# Patient Record
Sex: FEMALE | Race: ASIAN | Employment: FULL TIME | URBAN - METROPOLITAN AREA
[De-identification: names, ages, dates, MRNs, and addresses within clinical notes are randomized per-mention and may not be internally consistent; named-entity substitution may affect disease eponyms.]

---

## 2021-09-29 LAB
CHLAMYDIA, EXTERNAL: NEGATIVE
HBSAG, EXTERNAL: NON REACTIVE
N. GONORRHEA, EXTERNAL: NEGATIVE
RUBELLA, EXTERNAL: NORMAL
TYPE, ABO & RH, EXTERNAL: NORMAL

## 2022-04-11 LAB — GRBS, EXTERNAL: NEGATIVE

## 2022-05-13 ENCOUNTER — HOSPITAL ENCOUNTER (INPATIENT)
Age: 32
LOS: 1 days | Discharge: HOME | End: 2022-05-16
Attending: OBSTETRICS & GYNECOLOGY | Admitting: OBSTETRICS & GYNECOLOGY
Payer: COMMERCIAL

## 2022-05-13 LAB
ABO + RH BLD: NORMAL
BASOPHILS # BLD: 0 K/UL (ref 0–0.1)
BASOPHILS NFR BLD: 0 % (ref 0–2)
BLOOD GROUP ANTIBODIES SERPL: NORMAL
DIFFERENTIAL METHOD BLD: ABNORMAL
EOSINOPHIL # BLD: 0.3 K/UL (ref 0–0.4)
EOSINOPHIL NFR BLD: 3 % (ref 0–5)
ERYTHROCYTE [DISTWIDTH] IN BLOOD BY AUTOMATED COUNT: 14.1 % (ref 11.6–14.5)
HCT VFR BLD AUTO: 36.2 % (ref 35–45)
HGB BLD-MCNC: 12.1 G/DL (ref 12–16)
IMM GRANULOCYTES # BLD AUTO: 0.3 K/UL (ref 0–0.04)
IMM GRANULOCYTES NFR BLD AUTO: 3 % (ref 0–0.5)
LYMPHOCYTES # BLD: 2.3 K/UL (ref 0.9–3.6)
LYMPHOCYTES NFR BLD: 22 % (ref 21–52)
MCH RBC QN AUTO: 30.7 PG (ref 24–34)
MCHC RBC AUTO-ENTMCNC: 33.4 G/DL (ref 31–37)
MCV RBC AUTO: 91.9 FL (ref 78–100)
MONOCYTES # BLD: 0.9 K/UL (ref 0.05–1.2)
MONOCYTES NFR BLD: 8 % (ref 3–10)
NEUTS SEG # BLD: 6.7 K/UL (ref 1.8–8)
NEUTS SEG NFR BLD: 64 % (ref 40–73)
NRBC # BLD: 0 K/UL (ref 0–0.01)
NRBC BLD-RTO: 0 PER 100 WBC
PLATELET # BLD AUTO: 196 K/UL (ref 135–420)
PMV BLD AUTO: 10 FL (ref 9.2–11.8)
RBC # BLD AUTO: 3.94 M/UL (ref 4.2–5.3)
SPECIMEN EXP DATE BLD: NORMAL
WBC # BLD AUTO: 10.5 K/UL (ref 4.6–13.2)

## 2022-05-13 PROCEDURE — 3E033VJ INTRODUCTION OF OTHER HORMONE INTO PERIPHERAL VEIN, PERCUTANEOUS APPROACH: ICD-10-PCS | Performed by: STUDENT IN AN ORGANIZED HEALTH CARE EDUCATION/TRAINING PROGRAM

## 2022-05-13 PROCEDURE — 65270000029 HC RM PRIVATE

## 2022-05-13 PROCEDURE — 59200 INSERT CERVICAL DILATOR: CPT

## 2022-05-13 PROCEDURE — 77030028565 HC CATH CERV RIPNG BLN COOK -B

## 2022-05-13 PROCEDURE — 86900 BLOOD TYPING SEROLOGIC ABO: CPT

## 2022-05-13 PROCEDURE — 85025 COMPLETE CBC W/AUTO DIFF WBC: CPT

## 2022-05-13 PROCEDURE — 74011250637 HC RX REV CODE- 250/637: Performed by: ADVANCED PRACTICE MIDWIFE

## 2022-05-13 RX ORDER — TERBUTALINE SULFATE 1 MG/ML
0.25 INJECTION SUBCUTANEOUS
Status: DISCONTINUED | OUTPATIENT
Start: 2022-05-13 | End: 2022-05-15 | Stop reason: HOSPADM

## 2022-05-13 RX ORDER — OXYTOCIN/0.9 % SODIUM CHLORIDE 30/500 ML
87.3 PLASTIC BAG, INJECTION (ML) INTRAVENOUS AS NEEDED
Status: COMPLETED | OUTPATIENT
Start: 2022-05-13 | End: 2022-05-15

## 2022-05-13 RX ORDER — LIDOCAINE HYDROCHLORIDE 10 MG/ML
20 INJECTION, SOLUTION EPIDURAL; INFILTRATION; INTRACAUDAL; PERINEURAL AS NEEDED
Status: COMPLETED | OUTPATIENT
Start: 2022-05-13 | End: 2022-05-15

## 2022-05-13 RX ORDER — MINERAL OIL
30 OIL (ML) ORAL AS NEEDED
Status: COMPLETED | OUTPATIENT
Start: 2022-05-13 | End: 2022-05-15

## 2022-05-13 RX ORDER — HYDROMORPHONE HYDROCHLORIDE 1 MG/ML
1 INJECTION, SOLUTION INTRAMUSCULAR; INTRAVENOUS; SUBCUTANEOUS
Status: DISCONTINUED | OUTPATIENT
Start: 2022-05-13 | End: 2022-05-15 | Stop reason: HOSPADM

## 2022-05-13 RX ORDER — OXYTOCIN/RINGER'S LACTATE 30/500 ML
10 PLASTIC BAG, INJECTION (ML) INTRAVENOUS AS NEEDED
Status: COMPLETED | OUTPATIENT
Start: 2022-05-13 | End: 2022-05-15

## 2022-05-13 RX ORDER — ZOLPIDEM TARTRATE 5 MG/1
5 TABLET ORAL
Status: DISCONTINUED | OUTPATIENT
Start: 2022-05-13 | End: 2022-05-16 | Stop reason: HOSPADM

## 2022-05-13 RX ORDER — ONDANSETRON 2 MG/ML
4 INJECTION INTRAMUSCULAR; INTRAVENOUS
Status: DISCONTINUED | OUTPATIENT
Start: 2022-05-13 | End: 2022-05-15 | Stop reason: HOSPADM

## 2022-05-13 RX ORDER — SODIUM CHLORIDE, SODIUM LACTATE, POTASSIUM CHLORIDE, CALCIUM CHLORIDE 600; 310; 30; 20 MG/100ML; MG/100ML; MG/100ML; MG/100ML
125 INJECTION, SOLUTION INTRAVENOUS CONTINUOUS
Status: DISCONTINUED | OUTPATIENT
Start: 2022-05-13 | End: 2022-05-15 | Stop reason: HOSPADM

## 2022-05-13 RX ORDER — BUTORPHANOL TARTRATE 2 MG/ML
2 INJECTION INTRAMUSCULAR; INTRAVENOUS
Status: DISCONTINUED | OUTPATIENT
Start: 2022-05-13 | End: 2022-05-15 | Stop reason: HOSPADM

## 2022-05-13 RX ORDER — METHYLERGONOVINE MALEATE 0.2 MG/ML
0.2 INJECTION INTRAVENOUS AS NEEDED
Status: COMPLETED | OUTPATIENT
Start: 2022-05-13 | End: 2022-05-15

## 2022-05-13 RX ORDER — NALBUPHINE HYDROCHLORIDE 10 MG/ML
10 INJECTION, SOLUTION INTRAMUSCULAR; INTRAVENOUS; SUBCUTANEOUS
Status: DISCONTINUED | OUTPATIENT
Start: 2022-05-13 | End: 2022-05-15 | Stop reason: HOSPADM

## 2022-05-13 RX ADMIN — ZOLPIDEM TARTRATE 5 MG: 5 TABLET ORAL at 23:55

## 2022-05-13 NOTE — PROGRESS NOTES
Labor Progress Note    Patient seen, fetal heart rate and contraction pattern evaluated. Physical Exam:  Pelvic: Cervix 1-2,   Effaced: 50%  Station:  -2     Intact  Contractions: Every occasional minutes, mild  Fetal Heart Rate: Baseline: 140 per minute  Variability: moderate  Accelerations: yes  Decelerations: none  Cat I FHR strip    Assessment:  Not in labor. Procedure explained to the patient and her significant other. Consent obtained. A sterile speculum was inserted in the vaginal, cervix visualized. Cook cervical ripening balloon inserted into the cervix and 80mL normal saline inserted into the uterine balloon. Speculum removed. SVE to verify placement of vaginal balloon. Vaginal balloon filled with 80mL normal saline. Catheter taped to right thigh. Patient and fetus tolerated procedure well. PLAN:  Reassuring fetal status, Continue plan for vaginal delivery. Cook balloon placed. Start pitocin per protocol when cook balloon out.        Lorenzo Kang

## 2022-05-13 NOTE — PROGRESS NOTES
presents at 40.6 weeks gestation for induction. EFM applied. Hx confirmed. Patient oriented to room.

## 2022-05-13 NOTE — PROGRESS NOTES
Bedside shift change report given to BRIAN Londono RN (oncoming nurse) by MAU Christian (offgoing nurse). Report included the following information SBAR, Kardex, Intake/Output, MAR and Recent Results.

## 2022-05-13 NOTE — H&P
Obstetrical History and Physical    Subjective:     Date of Admission: 2022    Patient is a 33 y.o.   female admitted with postedates induction of labor at 36 6/7 weeks gestation. For Obstetric history, see prenantal.    For Review of Systems, see prenatal    History reviewed. No pertinent past medical history. Past Surgical History:   Procedure Laterality Date    HX WISDOM TEETH EXTRACTION Bilateral       Prior to Admission medications    Medication Sig Start Date End Date Taking? Authorizing Provider   PNV Comb #2-Iron-FA-Omega 3 29-1-400 mg cmpk Take  by mouth. Yes Provider, Historical     No Known Allergies   Social History     Tobacco Use    Smoking status: Former Smoker     Packs/day: 1.00     Years: 8.00     Pack years: 8.00     Quit date:      Years since quitting: 15.3    Smokeless tobacco: Never Used   Substance Use Topics    Alcohol use: Not Currently      History reviewed. No pertinent family history. Objective:     Blood pressure 96/78, pulse 86, temperature 98.2 °F (36.8 °C), resp. rate 18, height 5' 8\" (1.727 m), weight 95.3 kg (210 lb), SpO2 98 %, not currently breastfeeding. Temp (24hrs), Av.2 °F (36.8 °C), Min:98.2 °F (36.8 °C), Max:98.2 °F (36.8 °C)        No intake/output data recorded. No intake/output data recorded. HEENT: No pallor, no jaundice, Thyroid and throat normal  RESPIRATORY: Clear to A & P  CVS: pulse reg, HS normal  ABDOMEN: Gravid. Vertex. No abnormal tenderness. Pelvic: Cervix 1-2,   Effaced: 50%  Station:  -2  Data Review:   No results found for this or any previous visit (from the past 24 hour(s)). Monitor:    FHT:    Uterine Activity:     Assessment:     Active Problems:    * No active hospital problems.  *      Plan:       Check labs:  CBC,T&S    Disposition:     Admit to labor and delivery  Labs as ordered  IV - LR  Cook balloon placement  GBS negative  Anesthesia consult for epidural if pt desires  Anticipate vaginal delivery   CS for maternal/fetal indications  Pitocin augmentation orders placed for when cook balloon out  Dr. Annemarie Houser aware of admission and POC     I saw and examined patient.     Signed By: Trisha Pérez CNM                         May 13, 2022

## 2022-05-14 ENCOUNTER — ANESTHESIA (OUTPATIENT)
Dept: LABOR AND DELIVERY | Age: 32
End: 2022-05-14
Payer: COMMERCIAL

## 2022-05-14 ENCOUNTER — ANESTHESIA EVENT (OUTPATIENT)
Dept: LABOR AND DELIVERY | Age: 32
End: 2022-05-14
Payer: COMMERCIAL

## 2022-05-14 PROCEDURE — 74011000250 HC RX REV CODE- 250: Performed by: NURSE ANESTHETIST, CERTIFIED REGISTERED

## 2022-05-14 PROCEDURE — 10907ZC DRAINAGE OF AMNIOTIC FLUID, THERAPEUTIC FROM PRODUCTS OF CONCEPTION, VIA NATURAL OR ARTIFICIAL OPENING: ICD-10-PCS | Performed by: MIDWIFE

## 2022-05-14 PROCEDURE — 76060000078 HC EPIDURAL ANESTHESIA

## 2022-05-14 PROCEDURE — 74011250636 HC RX REV CODE- 250/636: Performed by: ADVANCED PRACTICE MIDWIFE

## 2022-05-14 PROCEDURE — 74011250636 HC RX REV CODE- 250/636

## 2022-05-14 PROCEDURE — 74011250636 HC RX REV CODE- 250/636: Performed by: NURSE ANESTHETIST, CERTIFIED REGISTERED

## 2022-05-14 PROCEDURE — 74011000258 HC RX REV CODE- 258: Performed by: NURSE ANESTHETIST, CERTIFIED REGISTERED

## 2022-05-14 RX ORDER — ROPIVACAINE IN 0.9% SOD CHL/PF 0.2 %
PLASTIC BAG, INJECTION (ML) EPIDURAL AS NEEDED
Status: DISCONTINUED | OUTPATIENT
Start: 2022-05-14 | End: 2022-05-14 | Stop reason: HOSPADM

## 2022-05-14 RX ORDER — NALOXONE HYDROCHLORIDE 0.4 MG/ML
0.2 INJECTION, SOLUTION INTRAMUSCULAR; INTRAVENOUS; SUBCUTANEOUS AS NEEDED
Status: DISCONTINUED | OUTPATIENT
Start: 2022-05-14 | End: 2022-05-15 | Stop reason: HOSPADM

## 2022-05-14 RX ORDER — SODIUM CHLORIDE 0.9 % (FLUSH) 0.9 %
5-40 SYRINGE (ML) INJECTION EVERY 8 HOURS
Status: DISCONTINUED | OUTPATIENT
Start: 2022-05-14 | End: 2022-05-15 | Stop reason: HOSPADM

## 2022-05-14 RX ORDER — LIDOCAINE HYDROCHLORIDE AND EPINEPHRINE 15; 5 MG/ML; UG/ML
INJECTION, SOLUTION EPIDURAL AS NEEDED
Status: DISCONTINUED | OUTPATIENT
Start: 2022-05-14 | End: 2022-05-14 | Stop reason: HOSPADM

## 2022-05-14 RX ORDER — SODIUM CHLORIDE 0.9 % (FLUSH) 0.9 %
5-40 SYRINGE (ML) INJECTION AS NEEDED
Status: DISCONTINUED | OUTPATIENT
Start: 2022-05-14 | End: 2022-05-15 | Stop reason: HOSPADM

## 2022-05-14 RX ORDER — OXYTOCIN/0.9 % SODIUM CHLORIDE 30/500 ML
0-20 PLASTIC BAG, INJECTION (ML) INTRAVENOUS
Status: DISCONTINUED | OUTPATIENT
Start: 2022-05-14 | End: 2022-05-16 | Stop reason: HOSPADM

## 2022-05-14 RX ORDER — EPHEDRINE SULFATE/0.9% NACL/PF 50 MG/5 ML
10 SYRINGE (ML) INTRAVENOUS AS NEEDED
Status: DISCONTINUED | OUTPATIENT
Start: 2022-05-14 | End: 2022-05-15 | Stop reason: HOSPADM

## 2022-05-14 RX ORDER — LIDOCAINE HYDROCHLORIDE 10 MG/ML
INJECTION INFILTRATION; PERINEURAL AS NEEDED
Status: DISCONTINUED | OUTPATIENT
Start: 2022-05-14 | End: 2022-05-14 | Stop reason: HOSPADM

## 2022-05-14 RX ORDER — METOCLOPRAMIDE HYDROCHLORIDE 5 MG/ML
10 INJECTION INTRAMUSCULAR; INTRAVENOUS
Status: DISCONTINUED | OUTPATIENT
Start: 2022-05-14 | End: 2022-05-15 | Stop reason: HOSPADM

## 2022-05-14 RX ORDER — FENTANYL/ROPIVACAINE/NS/PF 2MCG/ML-.1
1-15 PLASTIC BAG, INJECTION (ML) EPIDURAL
Status: DISCONTINUED | OUTPATIENT
Start: 2022-05-14 | End: 2022-05-15 | Stop reason: HOSPADM

## 2022-05-14 RX ORDER — DIPHENHYDRAMINE HYDROCHLORIDE 50 MG/ML
25 INJECTION, SOLUTION INTRAMUSCULAR; INTRAVENOUS
Status: DISCONTINUED | OUTPATIENT
Start: 2022-05-14 | End: 2022-05-15 | Stop reason: HOSPADM

## 2022-05-14 RX ORDER — PHENYLEPHRINE HCL IN 0.9% NACL 1 MG/10 ML
80 SYRINGE (ML) INTRAVENOUS AS NEEDED
Status: DISCONTINUED | OUTPATIENT
Start: 2022-05-14 | End: 2022-05-15 | Stop reason: HOSPADM

## 2022-05-14 RX ORDER — DIPHENHYDRAMINE HYDROCHLORIDE 50 MG/ML
25 INJECTION, SOLUTION INTRAMUSCULAR; INTRAVENOUS ONCE
Status: COMPLETED | OUTPATIENT
Start: 2022-05-14 | End: 2022-05-14

## 2022-05-14 RX ADMIN — SODIUM CHLORIDE, SODIUM LACTATE, POTASSIUM CHLORIDE, AND CALCIUM CHLORIDE 125 ML/HR: 600; 310; 30; 20 INJECTION, SOLUTION INTRAVENOUS at 12:58

## 2022-05-14 RX ADMIN — Medication 2 MILLI-UNITS/MIN: at 05:30

## 2022-05-14 RX ADMIN — Medication 5 ML: at 19:11

## 2022-05-14 RX ADMIN — ROPIVACAINE HYDROCHLORIDE 12 ML/HR: 5 INJECTION EPIDURAL; INFILTRATION; PERINEURAL at 19:09

## 2022-05-14 RX ADMIN — LIDOCAINE HYDROCHLORIDE 1 ML: 10 INJECTION, SOLUTION INFILTRATION; PERINEURAL at 19:05

## 2022-05-14 RX ADMIN — DIPHENHYDRAMINE HYDROCHLORIDE 25 MG: 50 INJECTION, SOLUTION INTRAMUSCULAR; INTRAVENOUS at 01:34

## 2022-05-14 RX ADMIN — SODIUM CHLORIDE, SODIUM LACTATE, POTASSIUM CHLORIDE, AND CALCIUM CHLORIDE 125 ML/HR: 600; 310; 30; 20 INJECTION, SOLUTION INTRAVENOUS at 00:29

## 2022-05-14 RX ADMIN — LIDOCAINE HYDROCHLORIDE AND EPINEPHRINE 4.5 ML: 15; 5 INJECTION, SOLUTION EPIDURAL at 19:06

## 2022-05-14 RX ADMIN — SODIUM CHLORIDE, SODIUM LACTATE, POTASSIUM CHLORIDE, AND CALCIUM CHLORIDE 125 ML/HR: 600; 310; 30; 20 INJECTION, SOLUTION INTRAVENOUS at 19:03

## 2022-05-14 NOTE — ANESTHESIA PREPROCEDURE EVALUATION
Relevant Problems   No relevant active problems       Anesthetic History   No history of anesthetic complications            Review of Systems / Medical History  Patient summary reviewed, nursing notes reviewed and pertinent labs reviewed    Pulmonary  Within defined limits                 Neuro/Psych   Within defined limits           Cardiovascular  Within defined limits                Exercise tolerance: >4 METS     GI/Hepatic/Renal  Within defined limits              Endo/Other  Within defined limits           Other Findings              Physical Exam    Airway  Mallampati: I  TM Distance: 4 - 6 cm  Neck ROM: normal range of motion   Mouth opening: Normal     Cardiovascular  Regular rate and rhythm,  S1 and S2 normal,  no murmur, click, rub, or gallop             Dental  No notable dental hx       Pulmonary                 Abdominal  GI exam deferred       Other Findings            Anesthetic Plan    ASA: 1  Anesthesia type: epidural            Anesthetic plan and risks discussed with: Patient and Father      Risks reviewed with pt and significant other, agree to proceed.

## 2022-05-14 NOTE — PROGRESS NOTES
0715 Bedside and Verbal shift change report given to BIANKA Man (oncoming nurse) by Zachary Perez RN (offgoing nurse). Report included the following information SBAR, Kardex, Procedure Summary, Intake/Output, MAR and Recent Results. Assessment completed. 0725 KWAN Andrews GARIMA on unit. Requested breakfast for pt. Okay to eat breakfast, clear liquids after. SBAR to KWAN Andrews GARIMA Pitocin on 2 mu/min and will increase when acuity level of pt's on unit allows for safe increase. Menu provided to pt.    8533 Unit acuity level safe for increasing pitocin. TOCO adjusted. Pitocin up to 4 mu/min as ordered. 4100 Manoj APARICIOM to bedside. 1608 SVE done and AROM of mod amt clear fluid. 1829 Pt requests epidural at this time. IV bolus started. 685 Old Dear Flo Paged anesthesia provider. [address] [place], Anesthesia at bedside    1859: In position for epidural    1900: Epidural time out    1906: Epidural catheter placed, needle out    1906: Epidural test dose    1910: To left tilt    1911: Epidural loading dose admin    1915 Bedside and Verbal shift change report given to BRIAN Johnston RN (oncoming nurse) by John Man (offgoing nurse). Report included the following information SBAR, Kardex, Procedure Summary, Intake/Output, MAR and Recent Results.

## 2022-05-14 NOTE — ANESTHESIA PROCEDURE NOTES
Epidural Block    Patient location during procedure: OB  Start time: 5/14/2022 7:05 PM  End time: 5/14/2022 7:06 PM  Reason for block: labor epidural  Staffing  Performed: CRNA   Anesthesiologist: Jarrod Zamudio DO  Resident/CRNA: Dusty HENDRICKS CRNA  Preanesthetic Checklist  Completed: patient identified, IV checked, site marked, risks and benefits discussed, surgical consent, monitors and equipment checked, pre-op evaluation and timeout performed  Block Placement  Patient position: sitting  Prep: ChloraPrep  Sterility prep: mask, hand, gloves, drape and cap  Sedation level: no sedation  Patient monitoring: heart rate, frequent blood pressure checks and continuous pulse oximetry  Approach: midline  Location: lumbar  Lumbar location: L3-L4  Epidural  Loss of resistance technique: saline  Guidance: landmark technique  Needle  Needle type: Tuohy   Needle gauge: 17 G  Needle length: 9 cm  Needle insertion depth: 5 cm  Catheter type: end hole  Catheter size: 19 G  Catheter at skin depth: 10 cm  Catheter securement method: clear occlusive dressing, stabilization device and surgical tape  Test dose: negative  Assessment  Sensory level: T6  Block outcome: pain improved  Number of attempts: 1  Procedure assessment: patient tolerated procedure well with no immediate complications

## 2022-05-14 NOTE — PROGRESS NOTES
: Bedside and Verbal shift change report given to BRIAN Paulino RN (oncoming nurse) by Franc Lora. Ronny Delgado (offgoing nurse). Report included the following information SBAR, Kardex, Procedure Summary, Intake/Output, MAR and Recent Results. Vitaliy Godoy is laying comfortably in bed with a left tilt following her epidural placement. : Vitaliy Godoy attempted to void into a bed pan, was unable. Discussed straight cath and re-attempt vs indwelling foote, she elected to have foote catheter placed. No difficulty with catheter placement, clear yellow urine noted upon insertion. : SVE by JAMIE Cano: 6/90/-2. Vitaliy Godoy repositioned to L side-lying with peanut ball.    : Recurrent lates noted. Repositioned to R side-lying with peanut ball. : Recurrent lates noted. [facility] : SVE per JAMIE Estrella CNM: 6/100/-2 IUPC placed    010: Per JAMIE Cano, ok for 30 minute pitocin break, 400mg tums PO, 500mL bolus LR, and restart pitocin at 10mU in 30 minutes. 0110: Discussed pitocin break with Vitaliy Godoy, stated that she is feeling constant rectal pressure. SVE: 9.5/100/-1. No pitocin break at this time, patient repositioned and will labor down. Vitaliy Godoy declines Tums. 0140Nodolores Hickey reporting increased rectal pressure and urge to push. Kang Cleary CNM to bedside, SVE: 9.5/100/0. Patient repositioned and advised to call if changes or increased urge. 0215: C/C/+1 JAMIE Cano to bedside for second stage labor    0332:     0336: Placenta, bolus 10u pitocin    0338: Cytotec placed rectally    0340: Methergine given    0345: Hemabate given    0350: TXA administered via IVPB    0405: Stadol given for patient comfort during manual removal of clots    0408: Second hemabate given    0415: Bakri balloon placed by JAMIE Paige MD and JAMIE Cnao with 300mL internally.     0421: Bakri expelled

## 2022-05-14 NOTE — PROGRESS NOTES
CNM at bedside for placement of cook catheter. Patient tolerated well and denies further needs at this time.

## 2022-05-15 LAB
APTT PPP: 28 SEC (ref 23–36.4)
BASOPHILS # BLD: 0 K/UL (ref 0–0.1)
BASOPHILS NFR BLD: 0 % (ref 0–2)
DIFFERENTIAL METHOD BLD: ABNORMAL
EOSINOPHIL # BLD: 0 K/UL (ref 0–0.4)
EOSINOPHIL NFR BLD: 0 % (ref 0–5)
ERYTHROCYTE [DISTWIDTH] IN BLOOD BY AUTOMATED COUNT: 14.5 % (ref 11.6–14.5)
HCT VFR BLD AUTO: 35.5 % (ref 35–45)
HGB BLD-MCNC: 11.4 G/DL (ref 12–16)
IMM GRANULOCYTES # BLD AUTO: 0.1 K/UL (ref 0–0.04)
IMM GRANULOCYTES NFR BLD AUTO: 1 % (ref 0–0.5)
INR PPP: 1 (ref 0.8–1.2)
LYMPHOCYTES # BLD: 1.4 K/UL (ref 0.9–3.6)
LYMPHOCYTES NFR BLD: 9 % (ref 21–52)
MCH RBC QN AUTO: 29.9 PG (ref 24–34)
MCHC RBC AUTO-ENTMCNC: 32.1 G/DL (ref 31–37)
MCV RBC AUTO: 93.2 FL (ref 78–100)
MONOCYTES # BLD: 1.2 K/UL (ref 0.05–1.2)
MONOCYTES NFR BLD: 7 % (ref 3–10)
NEUTS SEG # BLD: 13.9 K/UL (ref 1.8–8)
NEUTS SEG NFR BLD: 84 % (ref 40–73)
NRBC # BLD: 0 K/UL (ref 0–0.01)
NRBC BLD-RTO: 0 PER 100 WBC
PLATELET # BLD AUTO: 170 K/UL (ref 135–420)
PMV BLD AUTO: 10.2 FL (ref 9.2–11.8)
PROTHROMBIN TIME: 13.6 SEC (ref 11.5–15.2)
RBC # BLD AUTO: 3.81 M/UL (ref 4.2–5.3)
WBC # BLD AUTO: 16.6 K/UL (ref 4.6–13.2)

## 2022-05-15 PROCEDURE — 75410000003 HC RECOV DEL/VAG/CSECN EA 0.5 HR

## 2022-05-15 PROCEDURE — 85610 PROTHROMBIN TIME: CPT

## 2022-05-15 PROCEDURE — 0KQM0ZZ REPAIR PERINEUM MUSCLE, OPEN APPROACH: ICD-10-PCS | Performed by: MIDWIFE

## 2022-05-15 PROCEDURE — 74011250637 HC RX REV CODE- 250/637

## 2022-05-15 PROCEDURE — 74011250636 HC RX REV CODE- 250/636: Performed by: OBSTETRICS & GYNECOLOGY

## 2022-05-15 PROCEDURE — 85025 COMPLETE CBC W/AUTO DIFF WBC: CPT

## 2022-05-15 PROCEDURE — 74011250637 HC RX REV CODE- 250/637: Performed by: MIDWIFE

## 2022-05-15 PROCEDURE — 74011250636 HC RX REV CODE- 250/636: Performed by: ADVANCED PRACTICE MIDWIFE

## 2022-05-15 PROCEDURE — 74011250636 HC RX REV CODE- 250/636

## 2022-05-15 PROCEDURE — 74011000258 HC RX REV CODE- 258: Performed by: OBSTETRICS & GYNECOLOGY

## 2022-05-15 PROCEDURE — 75410000000 HC DELIVERY VAGINAL/SINGLE

## 2022-05-15 PROCEDURE — 74011250636 HC RX REV CODE- 250/636: Performed by: MIDWIFE

## 2022-05-15 PROCEDURE — 74011000250 HC RX REV CODE- 250

## 2022-05-15 PROCEDURE — 85730 THROMBOPLASTIN TIME PARTIAL: CPT

## 2022-05-15 PROCEDURE — 88307 TISSUE EXAM BY PATHOLOGIST: CPT

## 2022-05-15 PROCEDURE — 74011000258 HC RX REV CODE- 258: Performed by: MIDWIFE

## 2022-05-15 PROCEDURE — 74011250636 HC RX REV CODE- 250/636: Performed by: NURSE ANESTHETIST, CERTIFIED REGISTERED

## 2022-05-15 PROCEDURE — 74011000258 HC RX REV CODE- 258

## 2022-05-15 PROCEDURE — 74011000250 HC RX REV CODE- 250: Performed by: MIDWIFE

## 2022-05-15 PROCEDURE — 75410000002 HC LABOR FEE PER 1 HR

## 2022-05-15 RX ORDER — LANOLIN ALCOHOL/MO/W.PET/CERES
1 CREAM (GRAM) TOPICAL
Status: DISCONTINUED | OUTPATIENT
Start: 2022-05-15 | End: 2022-05-16 | Stop reason: HOSPADM

## 2022-05-15 RX ORDER — FENTANYL/ROPIVACAINE/NS/PF 2MCG/ML-.1
PLASTIC BAG, INJECTION (ML) EPIDURAL
Status: COMPLETED
Start: 2022-05-15 | End: 2022-05-15

## 2022-05-15 RX ORDER — ACETAMINOPHEN 325 MG/1
650 TABLET ORAL
Status: DISCONTINUED | OUTPATIENT
Start: 2022-05-15 | End: 2022-05-16 | Stop reason: HOSPADM

## 2022-05-15 RX ORDER — CARBOPROST TROMETHAMINE 250 UG/ML
250 INJECTION, SOLUTION INTRAMUSCULAR ONCE
Status: COMPLETED | OUTPATIENT
Start: 2022-05-15 | End: 2022-05-15

## 2022-05-15 RX ORDER — AMPICILLIN 2 G/1
2 INJECTION, POWDER, FOR SOLUTION INTRAVENOUS EVERY 6 HOURS
Status: DISCONTINUED | OUTPATIENT
Start: 2022-05-15 | End: 2022-05-15

## 2022-05-15 RX ORDER — TRANEXAMIC ACID 10 MG/ML
INJECTION, SOLUTION INTRAVENOUS
Status: COMPLETED
Start: 2022-05-15 | End: 2022-05-15

## 2022-05-15 RX ORDER — LOPERAMIDE HYDROCHLORIDE 2 MG/1
4 CAPSULE ORAL ONCE
Status: ACTIVE | OUTPATIENT
Start: 2022-05-15 | End: 2022-05-15

## 2022-05-15 RX ORDER — LOPERAMIDE HYDROCHLORIDE 2 MG/1
4 CAPSULE ORAL
Status: DISCONTINUED | OUTPATIENT
Start: 2022-05-15 | End: 2022-05-16 | Stop reason: HOSPADM

## 2022-05-15 RX ORDER — IBUPROFEN 400 MG/1
800 TABLET ORAL
Status: DISCONTINUED | OUTPATIENT
Start: 2022-05-15 | End: 2022-05-16 | Stop reason: HOSPADM

## 2022-05-15 RX ORDER — CALCIUM CARBONATE 200(500)MG
400 TABLET,CHEWABLE ORAL ONCE
Status: DISPENSED | OUTPATIENT
Start: 2022-05-15 | End: 2022-05-15

## 2022-05-15 RX ORDER — MISOPROSTOL 200 UG/1
TABLET ORAL
Status: DISCONTINUED
Start: 2022-05-15 | End: 2022-05-15 | Stop reason: WASHOUT

## 2022-05-15 RX ORDER — CEFAZOLIN SODIUM/WATER 2 G/20 ML
2 SYRINGE (ML) INTRAVENOUS ONCE
Status: COMPLETED | OUTPATIENT
Start: 2022-05-15 | End: 2022-05-15

## 2022-05-15 RX ORDER — ACETAMINOPHEN 500 MG
1000 TABLET ORAL ONCE
Status: COMPLETED | OUTPATIENT
Start: 2022-05-15 | End: 2022-05-15

## 2022-05-15 RX ORDER — TRANEXAMIC ACID 10 MG/ML
1 INJECTION, SOLUTION INTRAVENOUS ONCE
Status: COMPLETED | OUTPATIENT
Start: 2022-05-15 | End: 2022-05-15

## 2022-05-15 RX ORDER — OXYCODONE AND ACETAMINOPHEN 5; 325 MG/1; MG/1
2 TABLET ORAL
Status: DISCONTINUED | OUTPATIENT
Start: 2022-05-15 | End: 2022-05-16 | Stop reason: HOSPADM

## 2022-05-15 RX ORDER — LANOLIN ALCOHOL/MO/W.PET/CERES
3 CREAM (GRAM) TOPICAL
Status: DISCONTINUED | OUTPATIENT
Start: 2022-05-15 | End: 2022-05-16 | Stop reason: HOSPADM

## 2022-05-15 RX ORDER — MISOPROSTOL 200 UG/1
TABLET ORAL
Status: COMPLETED
Start: 2022-05-15 | End: 2022-05-15

## 2022-05-15 RX ORDER — AMOXICILLIN 250 MG
1 CAPSULE ORAL
Status: DISCONTINUED | OUTPATIENT
Start: 2022-05-15 | End: 2022-05-16 | Stop reason: HOSPADM

## 2022-05-15 RX ORDER — PROMETHAZINE HYDROCHLORIDE 25 MG/ML
25 INJECTION, SOLUTION INTRAMUSCULAR; INTRAVENOUS
Status: DISCONTINUED | OUTPATIENT
Start: 2022-05-15 | End: 2022-05-16 | Stop reason: HOSPADM

## 2022-05-15 RX ADMIN — SODIUM CHLORIDE, POTASSIUM CHLORIDE, SODIUM LACTATE AND CALCIUM CHLORIDE 500 ML: 600; 310; 30; 20 INJECTION, SOLUTION INTRAVENOUS at 16:06

## 2022-05-15 RX ADMIN — CARBOPROST TROMETHAMINE 250 MCG: 250 INJECTION, SOLUTION INTRAMUSCULAR at 04:08

## 2022-05-15 RX ADMIN — BUTORPHANOL TARTRATE 2 MG: 2 INJECTION, SOLUTION INTRAMUSCULAR; INTRAVENOUS at 04:05

## 2022-05-15 RX ADMIN — AMPICILLIN SODIUM 2 G: 2 INJECTION, POWDER, FOR SOLUTION INTRAVENOUS at 09:25

## 2022-05-15 RX ADMIN — METHYLERGONOVINE MALEATE 0.2 MG: 0.2 INJECTION, SOLUTION INTRAMUSCULAR; INTRAVENOUS at 03:40

## 2022-05-15 RX ADMIN — AMPICILLIN SODIUM 2 G: 2 INJECTION, POWDER, FOR SOLUTION INTRAVENOUS at 02:56

## 2022-05-15 RX ADMIN — LIDOCAINE HYDROCHLORIDE 20 ML: 10 INJECTION, SOLUTION EPIDURAL; INFILTRATION; INTRACAUDAL; PERINEURAL at 03:55

## 2022-05-15 RX ADMIN — Medication 20 MILLI-UNITS/MIN: at 00:03

## 2022-05-15 RX ADMIN — SODIUM CHLORIDE, POTASSIUM CHLORIDE, SODIUM LACTATE AND CALCIUM CHLORIDE 1000 ML: 600; 310; 30; 20 INJECTION, SOLUTION INTRAVENOUS at 04:24

## 2022-05-15 RX ADMIN — CEFAZOLIN 2 G: 10 INJECTION, POWDER, FOR SOLUTION INTRAVENOUS at 05:20

## 2022-05-15 RX ADMIN — ACETAMINOPHEN 1000 MG: 500 TABLET ORAL at 02:53

## 2022-05-15 RX ADMIN — AMPICILLIN SODIUM 2 G: 2 INJECTION, POWDER, FOR SOLUTION INTRAVENOUS at 21:32

## 2022-05-15 RX ADMIN — TRANEXAMIC ACID 1000 MG: 10 INJECTION, SOLUTION INTRAVENOUS at 03:50

## 2022-05-15 RX ADMIN — AMPICILLIN SODIUM 2 G: 2 INJECTION, POWDER, FOR SOLUTION INTRAVENOUS at 15:13

## 2022-05-15 RX ADMIN — MINERAL OIL 30 ML: 1000 SOLUTION ORAL at 03:25

## 2022-05-15 RX ADMIN — GENTAMICIN SULFATE 320 MG: 40 INJECTION, SOLUTION INTRAMUSCULAR; INTRAVENOUS at 04:20

## 2022-05-15 RX ADMIN — Medication 87.3 MILLI-UNITS/MIN: at 03:53

## 2022-05-15 RX ADMIN — ROPIVACAINE HYDROCHLORIDE 12 ML/HR: 5 INJECTION EPIDURAL; INFILTRATION; PERINEURAL at 02:41

## 2022-05-15 RX ADMIN — MISOPROSTOL 1000 MCG: 200 TABLET ORAL at 03:38

## 2022-05-15 RX ADMIN — FERROUS SULFATE TAB 325 MG (65 MG ELEMENTAL FE) 325 MG: 325 (65 FE) TAB at 09:25

## 2022-05-15 RX ADMIN — Medication 10000 MILLI-UNITS: at 03:37

## 2022-05-15 RX ADMIN — LOPERAMIDE HYDROCHLORIDE 4 MG: 2 CAPSULE ORAL at 04:22

## 2022-05-15 RX ADMIN — CARBOPROST TROMETHAMINE 250 MCG: 250 INJECTION, SOLUTION INTRAMUSCULAR at 03:49

## 2022-05-15 NOTE — PROGRESS NOTES
OB Labor Note    Assessment:   G1 @ 41+0, IOL for late term, now IUP in active labor - s/p cook catheter, pitocin, AROM clear, IUPC this exam              Cat II fetal tracing - overall reassuring              Low risk PPH              Comfortable with epidural     Plan:   Fetal resuscitative measures as indicated   Titrate pitocin for adequate MVU's              Continue to monitor labor              Anticipate                    Subjective:              Pt. does not have any complaints. Pt. is not feeling contractions. Pt. is feeling fetal movement.       Objective:     V/E: 2258  6/100/-2, IUPC placed     Ctx: 3-4 min.     Cat II fetal tracing - baseline rate 140, accelerations, intermittent late decelerations, one variable deceleration, moderate variability       Visit Vitals  /73   Pulse 98   Temp 98.2 °F (36.8 °C)   Resp 16   Ht 5' 8\" (1.727 m)   Wt 95.3 kg (210 lb)   SpO2 99%   Breastfeeding No   BMI 31.93 kg/m²      Cervical Exam  Dilation (cm): 6  Eff: 100 %  Station: -2  Vaginal exam done by? : JAMIE Johansen CNM  Baby Position: Vertex  Membrane Status: AROM     Patient Vitals for the past 4 hrs:    Mode Fetal Heart Rate Variability Decelerations Accelerations RN Reviewed Strip?   22 2245      Yes   22 2230 External 140 (!) Less than or equal to 5 BPM None Yes    22 2215      Yes   22 2200 External 145 6-25 BPM Early;Variable Yes    22 2145      Yes   22 2130 External 160 6-25 BPM (!) Late No    22      Yes   22 2100 External 160 6-25 BPM (!) Late No    22      Yes   22      Yes   22 2000 External 140 6-25 BPM Variable Yes    22 1945      Yes   22 1930 External 135 6-25 BPM None Yes    22 1915      Yes          2022 11:07 PM

## 2022-05-15 NOTE — PROGRESS NOTES
Bedside and Verbal shift change report given to SONIA Bush (oncoming nurse) by Erika Foster RN (offgoing nurse). Report included the following information SBAR, Procedure Summary, Intake/Output, MAR and Recent Results. 0900: Assisted pt to bathroom using tommy-steady, foote catheter removed, uriel care, pad and gown changed. Pt ambulated back to bed. 1045: TRANSFER - OUT REPORT:    Verbal report given to BIANKA Heredia RN (name) on [facility]  being transferred to Mother Baby (unit) for routine progression of care       Report consisted of patients Situation, Background, Assessment and   Recommendations(SBAR). Information from the following report(s) SBAR, Procedure Summary, Intake/Output, MAR and Recent Results was reviewed with the receiving nurse. Lines:   Peripheral IV 05/13/22 Anterior;Distal;Left Forearm (Active)   Site Assessment Clean, dry, & intact 05/15/22 0716   Phlebitis Assessment 0 05/15/22 0716   Infiltration Assessment 0 05/15/22 0716   Dressing Status Clean, dry, & intact 05/15/22 0716   Dressing Type Transparent;Tape 05/15/22 0716   Hub Color/Line Status Pink;Capped 05/15/22 4781        Opportunity for questions and clarification was provided.       Patient transported with:   Registered Nurse

## 2022-05-15 NOTE — PROGRESS NOTES
1055 TRANSFER - IN REPORT:    Verbal report received from SONIA De La Cruz (name) on Tiff Lemus  being received from L&D(unit) for routine progression of care      Report consisted of patients Situation, Background, Assessment and   Recommendations(SBAR). Information from the following report(s) SBAR, Kardex, Procedure Summary, Intake/Output, MAR and Recent Results was reviewed with the receiving nurse. Opportunity for questions and clarification was provided. 65 infant fall prevention and safety agreement educated to patient and FOB and signed    1110 shift assessment complete, patient has no needs or concerns at this time    1120 got  latched    1133 patient given blue folder and educated on it    [phone] patient up to bathroom    1400 patient up to bathroom, voided 250ml and had small BM    1513 antibiotic started, reassessment complete    1535 informed Milena Mariela CNM of pt blood pressure, orders received waiting for them to be put in     1550 patient up to bathroom, voided 700mL    1606 Bolus started    1636 unhooked from IV, patient to breastfeed     1  BP and pulse retaken, patient has no needs or concerns at this time    1910 Bedside and Verbal shift change report given to KWAN Garrett RN (oncoming nurse) by Mary Cohen RN (offgoing nurse). Report included the following information SBAR, Kardex, Procedure Summary, Intake/Output, MAR and Recent Results.

## 2022-05-15 NOTE — PROGRESS NOTES
OB Labor Note    Assessment:   G1 @ 41+0, IOL for late term, now IUP in active labor - s/p cook catheter, pitocin, AROM clear, IUPC placed for MVU's              Cat II fetal tracing - overall reassuring              Low risk PPH              Comfortable with epidural     Plan:   Fetal resuscitative measures as indicated              Titrate pitocin for adequate MVU's              Continue to monitor labor              Anticipate                    Subjective:              Pt. does not have any complaints except for pelvic pressure.  Pt. is not feeling contractions.  Pt. is feeling fetal movement.       Objective:     V/E: 0138  9.5/100/0     Ctx: 3-4 min., inadequate MVU's     Cat II fetal tracing - baseline rate 150, accelerations, intermittent late, early and variable decelerations,  moderate variability     Visit Vitals  BP (!) 107/56   Pulse 94   Temp 98.2 °F (36.8 °C)   Resp 16   Ht 5' 8\" (1.727 m)   Wt 95.3 kg (210 lb)   SpO2 99%   Breastfeeding No   BMI 31.93 kg/m²      Cervical Exam  Dilation (cm): 9.5  Eff: 100 %  Station: 0  Vaginal exam done by? : JAMIE Rinaldi CNM  Baby Position: Vertex  Membrane Status: AROM     Patient Vitals for the past 4 hrs:    Mode Fetal Heart Rate Variability Decelerations Accelerations RN Reviewed Strip?   05/15/22 0000 External 145 6-25 BPM None No    22 2345      Yes   22 2330 External 140 6-25 BPM Variable Yes    22 2315      Yes   22 2300 External 145 6-25 BPM None Yes    22 2245      Yes   22 2230 External 140 (!) Less than or equal to 5 BPM None Yes    22 2215      Yes   22 2200 External 145 6-25 BPM Early;Variable Yes           5/15/2022 1:47 AM

## 2022-05-15 NOTE — PROGRESS NOTES
OB Labor Note    Assessment:   G1 @ 41+0, IOL for late term, now IUP in active labor - s/p cook catheter, pitocin, AROM clear   Cat II fetal tracing - overall reassuring   Low risk PPH   Comfortable with recent epidural    Plan: Fetal resuscitative measures as indicated   Continue to monitor labor   Anticipate        Subjective:   Pt. does not have any complaints. Pt. is not feeling contractions. Pt. is feeling fetal movement. Objective:    V/E:  6/90/-2    Ctx: 2-4 min. Cat II fetal tracing - baseline rate 140, accelerations, intermittent late decelerations, moderate variability     Visit Vitals  /72   Pulse 99   Temp 98.2 °F (36.8 °C)   Resp 16   Ht 5' 8\" (1.727 m)   Wt 95.3 kg (210 lb)   SpO2 99%   Breastfeeding No   BMI 31.93 kg/m²      Cervical Exam  Dilation (cm): 4.5  Eff: 50 %  Station: -1  Vaginal exam done by? : KWAN Andrews CNSUMANTH  Membrane Status: AROM     Patient Vitals for the past 4 hrs: Mode Fetal Heart Rate Variability Decelerations Accelerations RN Reviewed Strip?   22      Yes   22 External 140 6-25 BPM Variable Yes    22 194  Lyssa Coronel  Yes   22 1930 External 135 6-25 BPM None Yes    22 191  Isismariusz Ayse   Yes   22 190 External 140 6-25 BPM Variable Yes Yes   22 1845      Yes   22 1830 US Readjusted; External 140 6-25 BPM   Yes   22 1815      Yes   22 1810 US Readjusted        22 1800 US Readjusted; External 125 6-25 BPM None Yes Yes   22 1745      Yes   22 1730 US Readjusted; External 125 6-25 BPM None Yes Yes   22 1715      Yes   22 1700 External 130 6-25 BPM None Yes Yes          2022 8:57 PM

## 2022-05-15 NOTE — PROGRESS NOTES
DELIVERY SUMMARY    Patient:  Farshad Serrato    Delivery Type: Vaginal, Spontaneous   Delivery Date:   Delivery Time:  80  AM      Birth Weight:   46g     Sex:  female    Delivery Clinician:  Sofiya Mtz   Gestational Age: 41+1     Presentation: Vertex   Position: OA to Left  Occiput  Anterior      Apgars were 9  and 9       Resuscitation Method: Tactile Stimulation;Suctioning-bulb     Meconium Stained: None    Living Status: Living        Placenta Date/Time:  1030  AM   Placenta Removal: Spontaneous   Placenta Appearance: Normal - sent to pathology for suspected chorioamnionitis     Cord Information: 3 Vessels          Disposition of Cord Blood: lab for ABO/DNA    Blood Gases Sent?:  No         Cord pH:  none     Episiotomy: none  Laceration(s):  second degree ML    Estimated Blood Loss (ml): QBL 1645     Labor Events  Method:     IOL   Augmentation:  n/a  Cervical Ripening:  cook catheter     Induction - yes     Induction Indication - late term     Induction Method - pitocin, AROM     Complications - chorioamnionitis, PPH     NICU Admit - no     HTN Disorders - none     Diabetes - N/A     Operative Vaginal Delivery - none     Additional Delivery Comments - during second stage noted febrile to 101.4 with maternal and fetal tachycardia - tylenol 1g PO, AMP 2 gm/ Gentamycin 5mg/kg IVPB started for suspected chorioamnionitis,   viable female infant, OA to MYKEL,  infant to mother in stable condition for apgars of 9/9 , delayed cord clamping for 1 minute until pulsations ceased, cord clamped and cut by FOB and cord blood for ABO/DNA obtained, pitocin infusing for active management of third stage, placenta delivered appearing intact, soto, spont.  With 3 vc, fundus boggy - bimanual compressions while methergine ordered/given IM, uterine sweep with few membranes noted/ clots removed - uterine atony continued - cytotec 1000 mcg [place] placed, TXA 1 gm given, hemabait 250 mcg IM, straight catheter for bladder drainage,  and vaginal bleeding slowed, perineum inspected - second degree ML laceration noted - started repair with 3-0 vicryl, vaginal bleeding increased during laceration repair, stopped repair - uterine atony again, second IV site started and CBC/coag. studies drawn, stadol given for pain and epidural bolused,  uterine sweep again for membranes, Dr. Justin Ybarra called for bakri confirmation placement - placed and filled with 300 ml.  NS, Ancef 2g IVPB ordered/given, second hemabait ordered/given IM, Imodium 4mg given,  foote catheter placed,  then with uterine contractions Bakri expelled into vagina, vaginal bleeding now appeared under control, vaginal clots removed, local anesthesia with lidocaine placed for continued laceration repair/completed,  QBL 1722, complications: chorioamnionitis, PPH;   mother and infant in stable condition, discussed complications of delivery with parents

## 2022-05-15 NOTE — PROGRESS NOTES
Problem: Patient Education: Go to Patient Education Activity  Goal: Patient/Family Education  Outcome: Progressing Towards Goal     Problem: Pain  Goal: *Control of Pain  Outcome: Progressing Towards Goal     Problem: Patient Education: Go to Patient Education Activity  Goal: Patient/Family Education  Outcome: Progressing Towards Goal     Problem: Vaginal Delivery: Day of Delivery-Post delivery  Goal: Activity/Safety  Outcome: Progressing Towards Goal  Goal: Nutrition/Diet  Outcome: Progressing Towards Goal  Goal: Discharge Planning  Outcome: Progressing Towards Goal  Goal: Medications  Outcome: Progressing Towards Goal  Goal: Treatments/Interventions/Procedures  Outcome: Progressing Towards Goal  Goal: *Vital signs within defined limits  Outcome: Progressing Towards Goal  Goal: *Labs within defined limits  Outcome: Progressing Towards Goal  Goal: *Hemodynamically stable  Outcome: Progressing Towards Goal  Goal: *Optimal pain control at patient's stated goal  Outcome: Progressing Towards Goal  Goal: *Participates in infant care  Outcome: Progressing Towards Goal  Goal: *Demonstrates progressive activity  Outcome: Progressing Towards Goal  Goal: *Tolerating diet  Outcome: Progressing Towards Goal

## 2022-05-16 VITALS
RESPIRATION RATE: 18 BRPM | OXYGEN SATURATION: 98 % | DIASTOLIC BLOOD PRESSURE: 58 MMHG | HEIGHT: 68 IN | TEMPERATURE: 97.5 F | HEART RATE: 82 BPM | WEIGHT: 210 LBS | BODY MASS INDEX: 31.83 KG/M2 | SYSTOLIC BLOOD PRESSURE: 109 MMHG

## 2022-05-16 PROBLEM — Z34.90 PREGNANCY: Status: ACTIVE | Noted: 2022-05-16

## 2022-05-16 LAB
BASOPHILS # BLD: 0.1 K/UL (ref 0–0.1)
BASOPHILS NFR BLD: 0 % (ref 0–2)
DIFFERENTIAL METHOD BLD: ABNORMAL
EOSINOPHIL # BLD: 0.2 K/UL (ref 0–0.4)
EOSINOPHIL NFR BLD: 1 % (ref 0–5)
ERYTHROCYTE [DISTWIDTH] IN BLOOD BY AUTOMATED COUNT: 14.6 % (ref 11.6–14.5)
HCT VFR BLD AUTO: 27.9 % (ref 35–45)
HGB BLD-MCNC: 8.9 G/DL (ref 12–16)
IMM GRANULOCYTES # BLD AUTO: 0.2 K/UL (ref 0–0.04)
IMM GRANULOCYTES NFR BLD AUTO: 1 % (ref 0–0.5)
LYMPHOCYTES # BLD: 3.6 K/UL (ref 0.9–3.6)
LYMPHOCYTES NFR BLD: 24 % (ref 21–52)
MCH RBC QN AUTO: 30.9 PG (ref 24–34)
MCHC RBC AUTO-ENTMCNC: 31.9 G/DL (ref 31–37)
MCV RBC AUTO: 96.9 FL (ref 78–100)
MONOCYTES # BLD: 1.2 K/UL (ref 0.05–1.2)
MONOCYTES NFR BLD: 8 % (ref 3–10)
NEUTS SEG # BLD: 9.6 K/UL (ref 1.8–8)
NEUTS SEG NFR BLD: 65 % (ref 40–73)
NRBC # BLD: 0 K/UL (ref 0–0.01)
NRBC BLD-RTO: 0 PER 100 WBC
PLATELET # BLD AUTO: 181 K/UL (ref 135–420)
PMV BLD AUTO: 10.2 FL (ref 9.2–11.8)
RBC # BLD AUTO: 2.88 M/UL (ref 4.2–5.3)
WBC # BLD AUTO: 14.8 K/UL (ref 4.6–13.2)

## 2022-05-16 PROCEDURE — 36415 COLL VENOUS BLD VENIPUNCTURE: CPT

## 2022-05-16 PROCEDURE — 74011250637 HC RX REV CODE- 250/637: Performed by: MIDWIFE

## 2022-05-16 PROCEDURE — 85025 COMPLETE CBC W/AUTO DIFF WBC: CPT

## 2022-05-16 PROCEDURE — 65270000029 HC RM PRIVATE

## 2022-05-16 PROCEDURE — 74011000258 HC RX REV CODE- 258: Performed by: MIDWIFE

## 2022-05-16 PROCEDURE — 74011250636 HC RX REV CODE- 250/636: Performed by: MIDWIFE

## 2022-05-16 RX ORDER — IBUPROFEN 800 MG/1
800 TABLET ORAL
Qty: 90 TABLET | Refills: 0 | Status: SHIPPED | OUTPATIENT
Start: 2022-05-16

## 2022-05-16 RX ORDER — LANOLIN ALCOHOL/MO/W.PET/CERES
325 CREAM (GRAM) TOPICAL
Qty: 90 TABLET | Refills: 1 | Status: SHIPPED | OUTPATIENT
Start: 2022-05-17

## 2022-05-16 RX ADMIN — IBUPROFEN 800 MG: 400 TABLET, FILM COATED ORAL at 09:09

## 2022-05-16 RX ADMIN — FERROUS SULFATE TAB 325 MG (65 MG ELEMENTAL FE) 325 MG: 325 (65 FE) TAB at 09:03

## 2022-05-16 RX ADMIN — ACETAMINOPHEN 650 MG: 325 TABLET ORAL at 09:10

## 2022-05-16 RX ADMIN — GENTAMICIN SULFATE 320 MG: 40 INJECTION, SOLUTION INTRAMUSCULAR; INTRAVENOUS at 04:18

## 2022-05-16 NOTE — PROGRESS NOTES
Problem: Patient Education: Go to Patient Education Activity  Goal: Patient/Family Education  Outcome: Resolved/Met     Problem: Pain  Goal: *Control of Pain  Outcome: Resolved/Met     Problem: Patient Education: Go to Patient Education Activity  Goal: Patient/Family Education  Outcome: Resolved/Met     Problem: Vaginal Delivery: Day of Delivery-Post delivery  Goal: Off Pathway (Use only if patient is Off Pathway)  Outcome: Resolved/Met  Goal: Activity/Safety  Outcome: Resolved/Met  Goal: Consults, if ordered  Outcome: Resolved/Met  Goal: Nutrition/Diet  Outcome: Resolved/Met  Goal: Discharge Planning  Outcome: Resolved/Met  Goal: Medications  Outcome: Resolved/Met  Goal: Treatments/Interventions/Procedures  Outcome: Resolved/Met  Goal: *Vital signs within defined limits  Outcome: Resolved/Met  Goal: *Labs within defined limits  Outcome: Resolved/Met  Goal: *Hemodynamically stable  Outcome: Resolved/Met  Goal: *Optimal pain control at patient's stated goal  Outcome: Resolved/Met  Goal: *Participates in infant care  Outcome: Resolved/Met  Goal: *Demonstrates progressive activity  Outcome: Resolved/Met  Goal: *Tolerating diet  Outcome: Resolved/Met

## 2022-05-16 NOTE — DISCHARGE INSTRUCTIONS
POST DELIVERY DISCHARGE INSTRUCTIONS    Name: Marcelino Barba  YOB: 1990  Primary Diagnosis: Active Problems:    * No active hospital problems. *      General:   Diet/Diet Restrictions:  Eight 8-ounce glasses of fluid daily (water, juices); avoid excessive caffeine intake. Meals/snacks as desired which are high in fiber and carbohydrates and low in fat and cholesterol. Physical Activity / Restrictions / Safety:   Avoid heavy lifting, no more that 8 lbs. For 2-3 weeks; limit use of stairs to 2 times daily for the first week home. No driving for one week. Avoid intercourse 4-6 weeks, no douching or tampon use. Check with obstetrician before starting or resuming an exercise program.     Discharge Instructions/Special Treatment/Home Care Needs:   Continue prenatal vitamins. Continue to use squirt bottle with warm water on your episiotomy after each bathroom use until bleeding stops. Call your doctor for the following:   Fever over 101 degrees by mouth. Vaginal bleeding heavier than a normal menstrual period or clot larger than a golf ball. Red streaks or increased swelling of legs, painful red streaks on your breast.  Painful urination, constipation and increased pain or swelling or discharge with your incision. If you feel extremely anxious or overwhelmed. If you have thoughts of harming yourself and/or your baby. Pain Management:   Pain Management:   Take Acetaminophen (Tylenol) or Ibuprofen (Advil, Motrin), as directed for pain. Use a warm Sitz bath 3 times daily to relieve episiotomy or hemorrhoidal discomfort. For hemorrhoidal discomfort, use Tucks and Anusol cream as needed and directed. Follow-Up Care: These are general instructions for a healthy lifestyle:    No smoking/ No tobacco products/ Avoid exposure to second hand smoke    Surgeon General's Warning:  Quitting smoking now greatly reduces serious risk to your health.     Obesity, smoking, and sedentary lifestyle greatly increases your risk for illness    A healthy diet, regular physical exercise & weight monitoring are important for maintaining a healthy lifestyle

## 2022-05-16 NOTE — PROGRESS NOTES
1910 - Report received from BIANKA Ulrich, ARMEN. Care assumed at this time. Patient currently has visitors, will come back for assessment. 0710 - Bedside and Verbal shift change report given to BIANKA Wilson RN by Fartun Cat RN. Report included the following information SBAR, Kardex, Intake/Output and MAR.

## 2022-05-16 NOTE — PROGRESS NOTES
Progress Note    Patient: Billy Birmingham MRN: [id number]  SSN: [id number]    YOB: 1990  Age: 33 y.o. Sex: female      Subjective:     Postpartum Day: 1 Vaginal Delivery    The patient is without complaints. The patient is ambulating well. The patient is tolerating a normal diet. The baby is well. Objective:      Patient Vitals for the past 8 hrs:   BP Temp Pulse Resp SpO2   05/16/22 0800 (!) 109/58 97.5 °F (36.4 °C) 82 18 98 %     LABS: Recent Results (from the past 24 hour(s))   CBC WITH AUTOMATED DIFF    Collection Time: 05/16/22  5:46 AM   Result Value Ref Range    WBC 14.8 (H) 4.6 - 13.2 K/uL    RBC 2.88 (L) 4.20 - 5.30 M/uL    HGB 8.9 (L) 12.0 - 16.0 g/dL    HCT 27.9 (L) 35.0 - 45.0 %    MCV 96.9 78.0 - 100.0 FL    MCH 30.9 24.0 - 34.0 PG    MCHC 31.9 31.0 - 37.0 g/dL    RDW 14.6 (H) 11.6 - 14.5 %    PLATELET 024 000 - 596 K/uL    MPV 10.2 9.2 - 11.8 FL    NRBC 0.0 0  WBC    ABSOLUTE NRBC 0.00 0.00 - 0.01 K/uL    NEUTROPHILS 65 40 - 73 %    LYMPHOCYTES 24 21 - 52 %    MONOCYTES 8 3 - 10 %    EOSINOPHILS 1 0 - 5 %    BASOPHILS 0 0 - 2 %    IMMATURE GRANULOCYTES 1 (H) 0.0 - 0.5 %    ABS. NEUTROPHILS 9.6 (H) 1.8 - 8.0 K/UL    ABS. LYMPHOCYTES 3.6 0.9 - 3.6 K/UL    ABS. MONOCYTES 1.2 0.05 - 1.2 K/UL    ABS. EOSINOPHILS 0.2 0.0 - 0.4 K/UL    ABS. BASOPHILS 0.1 0.0 - 0.1 K/UL    ABS. IMM. GRANS. 0.2 (H) 0.00 - 0.04 K/UL    DF AUTOMATED         Lab Results   Component Value Date/Time    HGB 8.9 (L) 05/16/2022 05:46 AM     Lab Results   Component Value Date/Time    HCT 27.9 (L) 05/16/2022 05:46 AM      Lochia:  appropriate   Uterine Fundus:   firm, -1     Lab/Data Review: All lab results for the last 24 hours reviewed. Assessment:     Status post: Doing well postpartum vaginal delivery day 1. Plan:     Continue day 1 post-vaginal delivery orders. Postpartum care discussed including diet, ambulation, and actvitiy restrictions.      Signed By: Inge Soto CNM     May 16, 2022

## 2022-05-16 NOTE — DISCHARGE SUMMARY
Obstetrical Discharge Summary     Name: Doyle Ramachandran MRN: [id number]  SSN: [id number]    YOB: 1990  Age: 33 y.o. Sex: female      Allergies: Patient has no known allergies. Admit Date: 2022    Discharge Date: 2022     Admitting Physician: Julito Higuera MD     Attending Physician:  Chely Hidalgo MD     * Admission Diagnoses: pregnancy    * Discharge Diagnoses:   Information for the patient's :  Carolina Riding [[id number]]   Delivery of a 3.945 kg female infant via Vaginal, Spontaneous on 5/15/2022 at 3:32 AM  by Vicki Ortega. Apgars were 9  and 9 . Additional Diagnoses:    Lab Results   Component Value Date/Time    ABO/Rh(D) A POSITIVE 2022 05:08 PM    Rubella, External Immune 2021 12:00 AM    GrBStrep, External negative 2022 12:00 AM    ABO,Rh A+ 2021 12:00 AM      There is no immunization history on file for this patient. * Procedures:   * No surgery found *           * Discharge Condition: good    [facility] Course: Normal hospital course following the delivery. * Disposition: Home    Discharge Medications:   Current Discharge Medication List          * Follow-up Care/Patient Instructions:   Activity: Activity as tolerated  Diet: Regular Diet  Wound Care: None needed    Follow-up Information    None            Salma Tidwell CNM

## 2022-05-16 NOTE — PROGRESS NOTES
0710 Bedside and Verbal shift change report given to BIANKA Wilson RN (oncoming nurse) by KWAN Rowland RN (offgoing nurse). Report included the following information SBAR, Kardex, Intake/Output, MAR and Recent Results. 1748 Iron administered at this time. 0910 Tylenol and motrin administered at this time. Assessment completed at this time. IV removed. Pt denies further needs at this time. 1220 Pt sitting in bed at this time. 1445 D/C teaching complete and copy of D/C teaching instruction given to pt with verbal understanding. Pt given opportunity for questions and denies comments/concerns/questions at this time.